# Patient Record
Sex: FEMALE | URBAN - METROPOLITAN AREA
[De-identification: names, ages, dates, MRNs, and addresses within clinical notes are randomized per-mention and may not be internally consistent; named-entity substitution may affect disease eponyms.]

---

## 2022-07-20 ENCOUNTER — APPOINTMENT (RX ONLY)
Dept: URBAN - METROPOLITAN AREA CLINIC 304 | Facility: CLINIC | Age: 27
Setting detail: DERMATOLOGY
End: 2022-07-20

## 2022-07-20 DIAGNOSIS — L74.510 PRIMARY FOCAL HYPERHIDROSIS, AXILLA: ICD-10-CM | Status: INADEQUATELY CONTROLLED

## 2022-07-20 PROCEDURE — ? MIRADRY

## 2022-07-20 ASSESSMENT — LOCATION DETAILED DESCRIPTION DERM
LOCATION DETAILED: LEFT AXILLARY VAULT
LOCATION DETAILED: RIGHT AXILLARY VAULT

## 2022-07-20 ASSESSMENT — LOCATION ZONE DERM: LOCATION ZONE: AXILLAE

## 2022-07-20 ASSESSMENT — LOCATION SIMPLE DESCRIPTION DERM
LOCATION SIMPLE: RIGHT AXILLARY VAULT
LOCATION SIMPLE: LEFT AXILLARY VAULT

## 2022-07-20 NOTE — PROCEDURE: MIRADRY
Template Size: 60 x 120
Anesthesia Type: 1% lidocaine with epinephrine and a 1:10 solution of 8.4% sodium bicarbonate
Treatment Energy Level Location 2: 5
Price (Use Numbers Only, No Special Characters Or $): 1995
Location 2: Left Axilla
Initial Location: Right Axilla
Treatment Number Location 4: 1
Post-Care Instructions: I reviewed with the patient in detail post-care instructions. Patient should avoid sun until area fully healed.\\n\\nAfter the procedure, patient was instructed to:\\n1. Immediately ice the treated area using towel-wrapped ice packs and use NSAIDs to reduce swelling. Continue PRN over the next few days.\\n2.  Keep the area clean (wash with water and gentle liquid soap) and apply an OTC antibiotic ointment (Bacitracin) to prevent infection.\\n3.  Avoid shaving or applying antiperspirant/deodorant for the next few days.  If deodorant/antiperspirant is still desired after the treatment, discard any partially used product and open a new one.\\n4.  Wait a few days before resuming rigorous exercise and activity.\\n5.  Wear loose-fitting tops to avoid underarm irritation for the next few days.
Detail Level: Detailed
Consent: Written consent obtained, risks reviewed including but not limited to infection, swelling, pain, darker or lighter pigmentary change, banding, cyst-like growths, paresthesia, and persistent hyperhidrosis.
Comments: 140 cc numbing
Number Of Placements Location 2: 25
Comments Location 2: 120 cc numbing